# Patient Record
Sex: FEMALE | Race: WHITE | NOT HISPANIC OR LATINO | Employment: OTHER | URBAN - METROPOLITAN AREA
[De-identification: names, ages, dates, MRNs, and addresses within clinical notes are randomized per-mention and may not be internally consistent; named-entity substitution may affect disease eponyms.]

---

## 2023-03-30 DIAGNOSIS — J44.9 CHRONIC OBSTRUCTIVE PULMONARY DISEASE, UNSPECIFIED (MULTI): ICD-10-CM

## 2023-03-30 DIAGNOSIS — F41.9 ANXIETY: Primary | ICD-10-CM

## 2023-03-30 RX ORDER — UMECLIDINIUM 62.5 UG/1
1 AEROSOL, POWDER ORAL DAILY
Qty: 90 EACH | Refills: 3 | Status: SHIPPED | OUTPATIENT
Start: 2023-03-30 | End: 2023-06-28

## 2023-04-05 DIAGNOSIS — J43.9 PULMONARY EMPHYSEMA, UNSPECIFIED EMPHYSEMA TYPE (MULTI): Primary | ICD-10-CM

## 2023-04-05 RX ORDER — ALPRAZOLAM 0.5 MG/1
0.5 TABLET ORAL DAILY PRN
COMMUNITY
End: 2023-04-05 | Stop reason: SDUPTHER

## 2023-04-05 RX ORDER — ALPRAZOLAM 0.5 MG/1
0.5 TABLET ORAL DAILY PRN
Qty: 30 TABLET | Refills: 0 | Status: SHIPPED | OUTPATIENT
Start: 2023-04-05 | End: 2023-06-06 | Stop reason: SDUPTHER

## 2023-04-28 ENCOUNTER — TELEPHONE (OUTPATIENT)
Dept: PRIMARY CARE | Facility: CLINIC | Age: 68
End: 2023-04-28
Payer: MEDICARE

## 2023-05-02 ENCOUNTER — TELEPHONE (OUTPATIENT)
Dept: PRIMARY CARE | Facility: CLINIC | Age: 68
End: 2023-05-02
Payer: MEDICARE

## 2023-05-02 DIAGNOSIS — J44.1 COPD EXACERBATION (MULTI): Primary | ICD-10-CM

## 2023-05-02 RX ORDER — METHYLPREDNISOLONE 4 MG/1
TABLET ORAL
Qty: 21 TABLET | Refills: 0 | Status: SHIPPED | OUTPATIENT
Start: 2023-05-02 | End: 2023-05-09

## 2023-05-02 RX ORDER — AZITHROMYCIN 250 MG/1
TABLET, FILM COATED ORAL
Qty: 6 TABLET | Refills: 0 | Status: SHIPPED | OUTPATIENT
Start: 2023-05-02 | End: 2023-05-07

## 2023-06-06 DIAGNOSIS — F41.9 ANXIETY: ICD-10-CM

## 2023-06-06 RX ORDER — ALPRAZOLAM 0.5 MG/1
0.5 TABLET ORAL DAILY PRN
Qty: 30 TABLET | Refills: 0 | Status: SHIPPED | OUTPATIENT
Start: 2023-06-06

## 2023-07-05 DIAGNOSIS — F41.9 ANXIETY DISORDER, UNSPECIFIED: ICD-10-CM

## 2023-07-05 RX ORDER — ESCITALOPRAM OXALATE 10 MG/1
TABLET ORAL
Qty: 90 TABLET | Refills: 3 | Status: SHIPPED | OUTPATIENT
Start: 2023-07-05

## 2023-07-24 ENCOUNTER — TELEPHONE (OUTPATIENT)
Dept: PRIMARY CARE | Facility: CLINIC | Age: 68
End: 2023-07-24
Payer: MEDICARE

## 2023-07-24 NOTE — TELEPHONE ENCOUNTER
Pt called requesting to speak to  Directly , she is not doing well and is going into  skilled nursing .

## 2023-08-02 ENCOUNTER — NURSING HOME VISIT (OUTPATIENT)
Dept: POST ACUTE CARE | Facility: EXTERNAL LOCATION | Age: 68
End: 2023-08-02
Payer: MEDICARE

## 2023-08-02 DIAGNOSIS — J44.1 COPD EXACERBATION (MULTI): ICD-10-CM

## 2023-08-02 DIAGNOSIS — Z71.89 GOALS OF CARE, COUNSELING/DISCUSSION: ICD-10-CM

## 2023-08-02 DIAGNOSIS — J96.11 CHRONIC RESPIRATORY FAILURE WITH HYPOXIA (MULTI): Primary | ICD-10-CM

## 2023-08-02 PROCEDURE — 99306 1ST NF CARE HIGH MDM 50: CPT | Performed by: STUDENT IN AN ORGANIZED HEALTH CARE EDUCATION/TRAINING PROGRAM

## 2023-08-02 NOTE — LETTER
Patient: Odalys Garcia  : 1955    Encounter Date: 2023    Subjective  Patient ID: Odalys Garcia is a 67 y.o. female.    Pt is with COPD, HLD, EMIL who recently presented to the hospital with unresponsive epuisode. Pt was admitted with COPD exacerbation. Patient improved with supportive care and was discharge to snf in stable condition. She reports no complaints at this time, however, is having some difficutly with her BiPAP machine to which she was discharged with. Regards no additional issues.        Review of Systems   Constitutional: Negative.    HENT: Negative.     Respiratory:  Positive for shortness of breath and wheezing.    Cardiovascular: Negative.    Gastrointestinal: Negative.    Musculoskeletal: Negative.    Neurological: Negative.    Psychiatric/Behavioral: Negative.         ObjectiveVitals Reviewed via facility EMR   Physical Exam  Constitutional:       General: She is not in acute distress.     Appearance: She is not ill-appearing.   HENT:      Mouth/Throat:      Mouth: Mucous membranes are moist.      Pharynx: Oropharynx is clear. No oropharyngeal exudate or posterior oropharyngeal erythema.   Eyes:      Pupils: Pupils are equal, round, and reactive to light.   Cardiovascular:      Rate and Rhythm: Normal rate and regular rhythm.      Heart sounds: No murmur heard.  Pulmonary:      Effort: Pulmonary effort is normal. No respiratory distress.      Breath sounds: Stridor present. Wheezing present.      Comments: On chornic oxygen  Abdominal:      General: Abdomen is flat. Bowel sounds are normal. There is no distension.      Palpations: Abdomen is soft.      Tenderness: There is no abdominal tenderness.   Musculoskeletal:         General: No tenderness. Normal range of motion.   Skin:     General: Skin is warm and dry.   Neurological:      General: No focal deficit present.      Mental Status: She is alert and oriented to person, place, and time. Mental status is at baseline.   Psychiatric:          Mood and Affect: Mood normal.         Assessment/Plan  Diagnoses and all orders for this visit:  Chronic respiratory failure with hypoxia (CMS/HCC)  COPD exacerbation (CMS/HCC)  Goals of care, counseling/discussion      Pt seen and examined, hospital course extensively reviewed, BIPAP machine noted to be potentially malfunctioning, updated staff and DME. Discussed goals of care with son, will continue PT/OT, palliative referral, supportive care, routine labs.    >45 mintues spent in management of pt      Electronically Signed By: David Lares DO   8/6/23 10:04 PM

## 2023-08-04 ENCOUNTER — TELEPHONE (OUTPATIENT)
Dept: PRIMARY CARE | Facility: CLINIC | Age: 68
End: 2023-08-04
Payer: MEDICARE

## 2023-08-04 NOTE — TELEPHONE ENCOUNTER
Valeria calling from [facility] orders please call patient back at [phone] patient currently in thern

## 2023-08-06 PROBLEM — J44.1 COPD EXACERBATION (MULTI): Status: ACTIVE | Noted: 2023-08-06

## 2023-08-06 PROBLEM — Z71.89 GOALS OF CARE, COUNSELING/DISCUSSION: Status: ACTIVE | Noted: 2023-08-06

## 2023-08-06 PROBLEM — J96.11 CHRONIC RESPIRATORY FAILURE WITH HYPOXIA (MULTI): Status: ACTIVE | Noted: 2023-08-06

## 2023-08-06 ASSESSMENT — ENCOUNTER SYMPTOMS
CARDIOVASCULAR NEGATIVE: 1
CONSTITUTIONAL NEGATIVE: 1
PSYCHIATRIC NEGATIVE: 1
WHEEZING: 1
MUSCULOSKELETAL NEGATIVE: 1
GASTROINTESTINAL NEGATIVE: 1
NEUROLOGICAL NEGATIVE: 1
SHORTNESS OF BREATH: 1

## 2023-08-07 NOTE — PROGRESS NOTES
Subjective   Patient ID: Odalys Garcia is a 67 y.o. female.    Pt is with COPD, HLD, EMIL who recently presented to the hospital with unresponsive epuisode. Pt was admitted with COPD exacerbation. Patient improved with supportive care and was discharge to snf in stable condition. She reports no complaints at this time, however, is having some difficutly with her BiPAP machine to which she was discharged with. Regards no additional issues.        Review of Systems   Constitutional: Negative.    HENT: Negative.     Respiratory:  Positive for shortness of breath and wheezing.    Cardiovascular: Negative.    Gastrointestinal: Negative.    Musculoskeletal: Negative.    Neurological: Negative.    Psychiatric/Behavioral: Negative.         Objective Vitals Reviewed via facility EMR   Physical Exam  Constitutional:       General: She is not in acute distress.     Appearance: She is not ill-appearing.   HENT:      Mouth/Throat:      Mouth: Mucous membranes are moist.      Pharynx: Oropharynx is clear. No oropharyngeal exudate or posterior oropharyngeal erythema.   Eyes:      Pupils: Pupils are equal, round, and reactive to light.   Cardiovascular:      Rate and Rhythm: Normal rate and regular rhythm.      Heart sounds: No murmur heard.  Pulmonary:      Effort: Pulmonary effort is normal. No respiratory distress.      Breath sounds: Stridor present. Wheezing present.      Comments: On chornic oxygen  Abdominal:      General: Abdomen is flat. Bowel sounds are normal. There is no distension.      Palpations: Abdomen is soft.      Tenderness: There is no abdominal tenderness.   Musculoskeletal:         General: No tenderness. Normal range of motion.   Skin:     General: Skin is warm and dry.   Neurological:      General: No focal deficit present.      Mental Status: She is alert and oriented to person, place, and time. Mental status is at baseline.   Psychiatric:         Mood and Affect: Mood normal.         Assessment/Plan    Diagnoses and all orders for this visit:  Chronic respiratory failure with hypoxia (CMS/HCC)  COPD exacerbation (CMS/HCC)  Goals of care, counseling/discussion      Pt seen and examined, hospital course extensively reviewed, BIPAP machine noted to be potentially malfunctioning, updated staff and DME. Discussed goals of care with son, will continue PT/OT, palliative referral, supportive care, routine labs.    >45 mintues spent in management of pt

## 2023-08-08 ENCOUNTER — TELEPHONE (OUTPATIENT)
Dept: PRIMARY CARE | Facility: CLINIC | Age: 68
End: 2023-08-08
Payer: MEDICARE

## 2023-08-09 ENCOUNTER — NURSING HOME VISIT (OUTPATIENT)
Dept: POST ACUTE CARE | Facility: EXTERNAL LOCATION | Age: 68
End: 2023-08-09
Payer: MEDICARE

## 2023-08-09 DIAGNOSIS — J96.11 CHRONIC RESPIRATORY FAILURE WITH HYPOXIA (MULTI): ICD-10-CM

## 2023-08-09 DIAGNOSIS — J44.1 COPD EXACERBATION (MULTI): Primary | ICD-10-CM

## 2023-08-09 DIAGNOSIS — Z71.89 GOALS OF CARE, COUNSELING/DISCUSSION: ICD-10-CM

## 2023-08-09 PROCEDURE — 99310 SBSQ NF CARE HIGH MDM 45: CPT | Performed by: STUDENT IN AN ORGANIZED HEALTH CARE EDUCATION/TRAINING PROGRAM

## 2023-08-09 NOTE — LETTER
Patient: Odalys Garcia  : 1955    Encounter Date: 2023    Subjective  Patient ID: Odalys Garcia is a 67 y.o. female.    Pt seen and examined. She reports that she has not used BIPAP that often. Has a sleep study pending to which patient will try to get done at facility. Son stated multiple questions about care and all were answered. Did also meet with palliative to which family is considering. Reports no additional issues.        Review of Systems   Constitutional: Negative.    HENT: Negative.     Respiratory: Negative.     Cardiovascular: Negative.    Gastrointestinal: Negative.    Musculoskeletal: Negative.    Neurological: Negative.    Psychiatric/Behavioral: Negative.         ObjectiveVitals Reviewed via facility EMR   Physical Exam  Constitutional:       General: She is not in acute distress.     Appearance: She is not ill-appearing.   HENT:      Mouth/Throat:      Mouth: Mucous membranes are moist.      Pharynx: Oropharynx is clear. No oropharyngeal exudate or posterior oropharyngeal erythema.   Eyes:      Pupils: Pupils are equal, round, and reactive to light.   Cardiovascular:      Rate and Rhythm: Normal rate and regular rhythm.      Heart sounds: No murmur heard.  Pulmonary:      Effort: Pulmonary effort is normal. No respiratory distress.      Breath sounds: No wheezing.   Abdominal:      General: Abdomen is flat. Bowel sounds are normal. There is no distension.      Palpations: Abdomen is soft.      Tenderness: There is no abdominal tenderness.   Musculoskeletal:         General: No tenderness. Normal range of motion.   Skin:     General: Skin is warm and dry.   Neurological:      General: No focal deficit present.      Mental Status: She is alert and oriented to person, place, and time. Mental status is at baseline.   Psychiatric:         Mood and Affect: Mood normal.         Assessment/Plan  Diagnoses and all orders for this visit:  COPD exacerbation (CMS/[facility])  Chronic respiratory failure  with hypoxia (CMS/HCC)  Goals of care, counseling/discussion      Pt seen and examined. States overall doing well and medically stable. Encourage sleep study and compliance with BIpap. PT/OT. Family continuing to discuss palliative moving forward for patient. Continue supportive care, routine labs. All questions answered from son and patient    >45 minutes spent in management of pt      Electronically Signed By: David Laers DO   8/10/23  8:46 AM

## 2023-08-10 ASSESSMENT — ENCOUNTER SYMPTOMS
PSYCHIATRIC NEGATIVE: 1
MUSCULOSKELETAL NEGATIVE: 1
CONSTITUTIONAL NEGATIVE: 1
GASTROINTESTINAL NEGATIVE: 1
NEUROLOGICAL NEGATIVE: 1
RESPIRATORY NEGATIVE: 1
CARDIOVASCULAR NEGATIVE: 1

## 2023-08-10 NOTE — PROGRESS NOTES
Subjective   Patient ID: Odalys Garcia is a 67 y.o. female.    Pt seen and examined. She reports that she has not used BIPAP that often. Has a sleep study pending to which patient will try to get done at facility. Son stated multiple questions about care and all were answered. Did also meet with palliative to which family is considering. Reports no additional issues.        Review of Systems   Constitutional: Negative.    HENT: Negative.     Respiratory: Negative.     Cardiovascular: Negative.    Gastrointestinal: Negative.    Musculoskeletal: Negative.    Neurological: Negative.    Psychiatric/Behavioral: Negative.         Objective Vitals Reviewed via facility EMR   Physical Exam  Constitutional:       General: She is not in acute distress.     Appearance: She is not ill-appearing.   HENT:      Mouth/Throat:      Mouth: Mucous membranes are moist.      Pharynx: Oropharynx is clear. No oropharyngeal exudate or posterior oropharyngeal erythema.   Eyes:      Pupils: Pupils are equal, round, and reactive to light.   Cardiovascular:      Rate and Rhythm: Normal rate and regular rhythm.      Heart sounds: No murmur heard.  Pulmonary:      Effort: Pulmonary effort is normal. No respiratory distress.      Breath sounds: No wheezing.   Abdominal:      General: Abdomen is flat. Bowel sounds are normal. There is no distension.      Palpations: Abdomen is soft.      Tenderness: There is no abdominal tenderness.   Musculoskeletal:         General: No tenderness. Normal range of motion.   Skin:     General: Skin is warm and dry.   Neurological:      General: No focal deficit present.      Mental Status: She is alert and oriented to person, place, and time. Mental status is at baseline.   Psychiatric:         Mood and Affect: Mood normal.         Assessment/Plan   Diagnoses and all orders for this visit:  COPD exacerbation (CMS/[facility])  Chronic respiratory failure with hypoxia (CMS/[facility])  Goals of care, counseling/discussion      Pt  seen and examined. States overall doing well and medically stable. Encourage sleep study and compliance with BIpap. PT/OT. Family continuing to discuss palliative moving forward for patient. Continue supportive care, routine labs. All questions answered from son and patient    >45 minutes spent in management of pt

## 2023-08-16 ENCOUNTER — PATIENT OUTREACH (OUTPATIENT)
Dept: CARE COORDINATION | Facility: CLINIC | Age: 68
End: 2023-08-16
Payer: MEDICARE

## 2023-08-16 ENCOUNTER — DOCUMENTATION (OUTPATIENT)
Dept: PRIMARY CARE | Facility: CLINIC | Age: 68
End: 2023-08-16
Payer: MEDICARE

## 2023-08-16 RX ORDER — PREDNISONE 10 MG/1
1 TABLET ORAL DAILY
Qty: 21 TABLET | Refills: 2 | COMMUNITY
Start: 2023-07-26 | End: 2023-09-05 | Stop reason: ALTCHOICE

## 2023-08-16 RX ORDER — AZITHROMYCIN 250 MG/1
TABLET, FILM COATED ORAL
COMMUNITY
Start: 2023-07-25 | End: 2023-09-10

## 2023-08-16 RX ORDER — PREDNISONE 20 MG/1
TABLET ORAL EVERY 24 HOURS
COMMUNITY
Start: 2023-08-14 | End: 2023-08-16

## 2023-08-16 NOTE — PROGRESS NOTES
Discharge Facility: [facility]   Discharge Diagnosis: Acute on chronic respiratory failure with hypercapnia   Admission Date:  8/13/23   Discharge Date:  8/15/23     PCP Appointment Date:   8/28/23   Specialist Appointment Date:  9/14/23 pulm   Hospital Encounter and Summary: Linked   See discharge assessment below for further details    -continue 40mg Prednisone for another 3 days, then 10mg daily   -You will take Azithromycin every Monday, Wednesday, and Friday   -Xanax every 8 hours as needed for anxiety, please talk to your PCP about refills of this medications   You should resume your home Acetazolamide. You can continue your Lasix as   needed.     Issues to Discuss at Follow-up / Goals for Continuing Care:   1. Prednisone 10mg vs. 5mg long term   2. Xanax, review continued use   3. Continue to address goals of care. Given that patient's son is now living in [place] and she has more support, re-assess if she would be interested in bLVR. One of the limiting factors previously was her lack of ability to get herself to and from appointments.     Engagement  Call Start Time: 1150 (pts son returned call) (8/16/2023 11:53 AM)    Medications  Medications reviewed with patient/caregiver?: Yes (8/16/2023 11:53 AM)  Is the patient having any side effects they believe may be caused by any medication additions or changes?: No (8/16/2023 11:53 AM)  Does the patient have all medications ordered at discharge?: Yes (8/16/2023 11:53 AM)  Prescription Comments: script for prednisone, azithromycin and alprazolam given to pt at d/c (8/16/2023 11:53 AM)  Is the patient taking all medications as directed (includes completed medication regime)?: Yes (8/16/2023 11:53 AM)  Care Management Interventions: Provided patient education (8/16/2023 11:53 AM)  Medication Comments: pts son requesting refills of meds for nebulizer, son advised to call pulm for her routine meds and to ensure pulm wants them to stay the same. (8/16/2023 11:53  "AM)    Appointments  Does the patient have a primary care provider?: Yes (8/16/2023 11:53 AM)  Care Management Interventions: Verified appointment date/time/provider (8/16/2023 11:53 AM)  Has the patient kept scheduled appointments due by today?: Yes (8/16/2023 11:53 AM)  Care Management Interventions: Advised patient to keep appointment (8/16/2023 11:53 AM)    Self Management  What is the home health agency?: [facility] pall care at home (8/16/2023 11:53 AM)  What Durable Medical Equipment (DME) was ordered?: bi-pap (8/16/2023 11:53 AM)  Has all Durable Medical Equipment (DME) been delivered?: Yes (8/16/2023 11:53 AM)    Patient Teaching  Does the patient have access to their discharge instructions?: Yes (8/16/2023 11:53 AM)  Care Management Interventions: Reviewed instructions with patient (8/16/2023 11:53 AM)  What is the patient's perception of their health status since discharge?: Improving (8/16/2023 11:53 AM)  Is the patient/caregiver able to teach back the hierarchy of who to call/visit for symptoms/problems? PCP, Specialist, Home Health nurse, Urgent Care, ED, 911: Yes (8/16/2023 11:53 AM)  Patient/Caregiver Education Comments: cm spoke with natalie who states pt is home and doing \"ok\".reviewed new meds given at d/c. pt son advised to call pulm for follow up and refills on nebulizer meds. pt was in snf prior to hosp stay.  referral for pall med was ordered at discharge. pcp appt scheduled for 8/28.  contact info provided for any further questions or concerns. (8/16/2023 11:53 AM)            "

## 2023-08-28 ENCOUNTER — APPOINTMENT (OUTPATIENT)
Dept: PRIMARY CARE | Facility: CLINIC | Age: 68
End: 2023-08-28
Payer: MEDICARE

## 2023-08-28 PROBLEM — R09.02 HYPOXIA: Status: ACTIVE | Noted: 2023-08-28

## 2023-08-28 PROBLEM — K80.20 GALLSTONES: Status: ACTIVE | Noted: 2023-08-28

## 2023-08-28 PROBLEM — I24.9 ACS (ACUTE CORONARY SYNDROME) (MULTI): Status: ACTIVE | Noted: 2023-07-23

## 2023-08-28 PROBLEM — J96.02 ACUTE RESPIRATORY FAILURE WITH HYPOXIA AND HYPERCAPNIA (MULTI): Status: ACTIVE | Noted: 2023-08-28

## 2023-08-28 PROBLEM — R91.8 MULTIPLE LUNG NODULES: Status: ACTIVE | Noted: 2023-08-28

## 2023-08-28 PROBLEM — J15.69 GRAM-NEGATIVE PNEUMONIA (MULTI): Status: ACTIVE | Noted: 2023-08-28

## 2023-08-28 PROBLEM — R00.0 TACHYCARDIA: Status: ACTIVE | Noted: 2023-08-28

## 2023-08-28 PROBLEM — R07.9 CHEST PAIN: Status: ACTIVE | Noted: 2023-07-23

## 2023-08-28 PROBLEM — R06.02 SHORTNESS OF BREATH AT REST: Status: ACTIVE | Noted: 2023-07-23

## 2023-08-28 PROBLEM — E87.3 METABOLIC ALKALEMIA: Status: ACTIVE | Noted: 2023-08-28

## 2023-08-28 PROBLEM — J96.01 ACUTE RESPIRATORY FAILURE WITH HYPOXIA AND HYPERCAPNIA (MULTI): Status: ACTIVE | Noted: 2023-08-28

## 2023-08-28 PROBLEM — R42 DIZZINESS: Status: ACTIVE | Noted: 2023-08-28

## 2023-08-28 PROBLEM — J06.9 ACUTE URI: Status: ACTIVE | Noted: 2023-08-28

## 2023-08-28 PROBLEM — D72.829 LEUKOCYTOSIS: Status: ACTIVE | Noted: 2023-08-28

## 2023-08-28 PROBLEM — R73.9 HYPERGLYCEMIA: Status: ACTIVE | Noted: 2023-08-28

## 2023-08-28 PROBLEM — E78.5 HLD (HYPERLIPIDEMIA): Status: ACTIVE | Noted: 2023-08-28

## 2023-08-28 PROBLEM — J96.91 RESPIRATORY FAILURE WITH HYPOXIA (MULTI): Status: ACTIVE | Noted: 2023-08-28

## 2023-08-28 PROBLEM — R60.9 DEPENDENT EDEMA: Status: ACTIVE | Noted: 2023-08-28

## 2023-08-28 PROBLEM — R16.1 SPLENOMEGALY: Status: ACTIVE | Noted: 2023-08-28

## 2023-08-28 PROBLEM — K63.5 COLON POLYP: Status: ACTIVE | Noted: 2023-08-28

## 2023-08-28 PROBLEM — F41.9 ANXIETY: Status: ACTIVE | Noted: 2023-08-28

## 2023-08-28 PROBLEM — M79.89 LEG SWELLING: Status: ACTIVE | Noted: 2023-08-28

## 2023-08-28 PROBLEM — R74.8 HIGH LEVEL OF CARDIAC MARKER: Status: ACTIVE | Noted: 2023-08-28
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
## 2023-09-03 DIAGNOSIS — J44.9 CHRONIC OBSTRUCTIVE PULMONARY DISEASE, UNSPECIFIED (MULTI): ICD-10-CM

## 2023-09-03 DIAGNOSIS — M79.89 OTHER SPECIFIED SOFT TISSUE DISORDERS: ICD-10-CM

## 2023-09-05 ENCOUNTER — PATIENT OUTREACH (OUTPATIENT)
Dept: CARE COORDINATION | Facility: CLINIC | Age: 68
End: 2023-09-05
Payer: MEDICARE

## 2023-09-05 RX ORDER — ACETAZOLAMIDE 125 MG/1
TABLET ORAL
Qty: 30 TABLET | Refills: 5 | Status: SHIPPED | OUTPATIENT
Start: 2023-09-05

## 2023-09-05 RX ORDER — PREDNISONE 5 MG/1
5 TABLET ORAL DAILY
Qty: 90 TABLET | Refills: 3 | Status: SHIPPED | OUTPATIENT
Start: 2023-09-05

## 2023-09-05 NOTE — PROGRESS NOTES
Unable to reach patient for call back. Pcp appt 8/28 was canceled    LVM with call back number for patient to call if needed     Son returned call and states he has been in contact with pcp. [facility] has started pall care. Son inquiring about HHC.  According to discharge summary HHC was not ordered at hosp discharge.  Pall care was ordered. Son instructed to request hhc at pcp appt. Contact info provided.

## 2023-09-07 DIAGNOSIS — J96.11 CHRONIC RESPIRATORY FAILURE WITH HYPOXIA (MULTI): ICD-10-CM

## 2023-09-07 DIAGNOSIS — J44.1 COPD EXACERBATION (MULTI): ICD-10-CM

## 2023-09-07 RX ORDER — IPRATROPIUM BROMIDE AND ALBUTEROL SULFATE 2.5; .5 MG/3ML; MG/3ML
SOLUTION RESPIRATORY (INHALATION)
Qty: 180 ML | Refills: 3 | Status: SHIPPED | OUTPATIENT
Start: 2023-09-07

## 2023-09-19 ENCOUNTER — TELEPHONE (OUTPATIENT)
Dept: PRIMARY CARE | Facility: CLINIC | Age: 68
End: 2023-09-19
Payer: MEDICARE

## 2023-10-05 ENCOUNTER — PATIENT OUTREACH (OUTPATIENT)
Dept: CARE COORDINATION | Facility: CLINIC | Age: 68
End: 2023-10-05
Payer: MEDICARE

## 2023-10-05 NOTE — PROGRESS NOTES
Unable to reach patient for one month post discharge follow up call.   no voicemail available call attempts x 2 were made to contact the patient to assist with any questions or concerns patient may have.

## 2023-10-17 ENCOUNTER — TELEPHONE (OUTPATIENT)
Dept: PULMONOLOGY | Facility: CLINIC | Age: 68
End: 2023-10-17
Payer: MEDICARE

## 2023-10-17 NOTE — TELEPHONE ENCOUNTER
Pt is scheduled with  on 11/28/23 for 9:40 am.    Due to template changes appointment time needed to be moved up to 9:30.    Left detailed msg on pt's voicemail for time change.    Office number given on VM if there are any concerns.

## 2023-11-08 ENCOUNTER — PATIENT OUTREACH (OUTPATIENT)
Dept: CARE COORDINATION | Facility: CLINIC | Age: 68
End: 2023-11-08
Payer: MEDICARE

## 2023-11-28 ENCOUNTER — APPOINTMENT (OUTPATIENT)
Dept: PULMONOLOGY | Facility: CLINIC | Age: 68
End: 2023-11-28
Payer: MEDICARE